# Patient Record
Sex: MALE | Race: WHITE | NOT HISPANIC OR LATINO | URBAN - METROPOLITAN AREA
[De-identification: names, ages, dates, MRNs, and addresses within clinical notes are randomized per-mention and may not be internally consistent; named-entity substitution may affect disease eponyms.]

---

## 2023-06-03 ENCOUNTER — EMERGENCY (EMERGENCY)
Facility: HOSPITAL | Age: 78
LOS: 1 days | Discharge: HOME | End: 2023-06-03
Attending: EMERGENCY MEDICINE | Admitting: EMERGENCY MEDICINE
Payer: MEDICARE

## 2023-06-03 VITALS
RESPIRATION RATE: 16 BRPM | WEIGHT: 190.04 LBS | HEART RATE: 81 BPM | SYSTOLIC BLOOD PRESSURE: 163 MMHG | DIASTOLIC BLOOD PRESSURE: 89 MMHG | OXYGEN SATURATION: 100 % | TEMPERATURE: 99 F

## 2023-06-03 PROCEDURE — 99283 EMERGENCY DEPT VISIT LOW MDM: CPT | Mod: FS

## 2023-06-03 PROCEDURE — 99282 EMERGENCY DEPT VISIT SF MDM: CPT

## 2023-06-03 NOTE — ED PROVIDER NOTE - NSFOLLOWUPINSTRUCTIONS_ED_ALL_ED_FT
Follow up with Dr. Penaloza  return to er for any worsening symptoms     Indwelling Urinary Catheter Care, Adult  An indwelling urinary catheter is a thin, flexible tube that is placed into the bladder to help drain urine out of the body. The catheter is inserted into the urethra. The urethra is the part of the body that drains urine from the bladder. Urine drains from the catheter into a drainage bag outside of the body.    Taking good care of your catheter will keep it working properly and help to prevent problems from developing.    What are the risks?  Bacteria may get into your bladder and cause a urinary tract infection.  Urine flow can become blocked. This can happen if the catheter is not working correctly, or if you have sediment or a blood clot in your bladder or catheter.  Tissue near the catheter may become irritated and may bleed.  How to wear your catheter and your drainage bag  Supplies needed    Adhesive tape or a leg strap.  Alcohol wipe or soap and water (if you use tape).  A clean towel (if you use tape).  Overnight drainage bag.  Smaller drainage bag (leg bag).  Wearing your catheter and bag    Use adhesive tape or a leg strap to attach your catheter to your leg.  Make sure the catheter is not pulled tight.  If a leg strap gets wet, replace it with a dry one.  If you use adhesive tape:  Use an alcohol wipe or soap and water to wash off any stickiness on your skin where you had tape before.  Use a clean towel to pat-dry the area.  Apply the new tape.  You should have received a large overnight drainage bag and a smaller leg bag that fits underneath clothing.  You may wear the overnight bag at any time, but you should not wear the leg bag at night.  Make sure the overnight drainage bag is always lower than the level of your bladder, but do not let it touch the floor. Before you go to sleep, hang the bag inside a wastebasket that is covered by a clean plastic bag.  Secure the leg bag according to 's instructions. This may be above or below the knee, depending on the length of the tubing. Make sure that:  The leg bag is below the bladder.  The tubing does not have loops or too much tension.  How to care for the skin around the catheter  Supplies needed    A clean washcloth.  Water and mild soap.  A clean towel.  Caring for your skin and catheter    Female anatomy showing the labia, urethra, and an indwelling urinary catheter in the bladder.  Male anatomy showing the penis, urethra, and an indwelling urinary catheter in the bladder.  Every day, use a clean washcloth and soapy water to clean the skin around your catheter.  Wash your hands with soap and water.  Wet a washcloth in warm water and mild soap.  Clean the skin around your urethra.  If you are female:  Use one hand to gently spread the folds of skin around your vagina (labia).  With the washcloth in your other hand, wipe the inner side of your labia on each side. Do this in a front-to-back direction.  If you are male:  Use one hand to pull back any skin that covers the end of your penis (foreskin).  With the washcloth in your other hand, wipe your penis in small circles. Start wiping at the tip of your penis, then move outward from the catheter.  Move the foreskin back in place, if needed.  With your free hand, hold the catheter close to where it enters your body. Keep holding the catheter during cleaning so it does not get pulled out.  Use your other hand to clean the catheter with the washcloth.  Only wipe downward on the catheter, toward the bag.  Do not wipe upward toward your body, because that may push bacteria into your urethra and cause infection.  Use a clean towel to pat-dry the catheter and the skin around it. Make sure to wipe off all soap.  Wash your hands with soap and water.  Shower every day. Do not take baths.  Do not use cream, ointment, or lotion on the area where the catheter enters your body, unless your health care provider tells you to do that.  Do not use powders, sprays, or lotions on your genital area.  Check your skin around the catheter every day for signs of infection. Check for:  Redness, swelling, or pain.  Fluid or blood.  Warmth.  Pus or a bad smell.  How to empty the drainage bag  Supplies needed    Rubbing alcohol.  Gauze pad or cotton ball.  Adhesive tape or a leg strap.  Emptying the bag    Empty your drainage bag (your overnight drainage bag or your leg bag) when it is ?–½ full, or at least 2–3 times a day. Clean the drainage bag according to the 's instructions or as told by your health care provider.  Wash your hands with soap and water.  Detach the drainage bag from your leg.  Hold the drainage bag over the toilet or a clean container. Make sure the drainage bag is lower than your hips and bladder. This stops urine from going back into the tubing and into your bladder.  Open the pour spout at the bottom of the bag.  Empty the urine into the toilet or container. Do not let the pour spout touch any surface. This precaution is important to prevent bacteria from getting in the bag and causing infection.  Apply rubbing alcohol to a gauze pad or cotton ball.  Use the gauze pad or cotton ball to clean the pour spout.  Close the pour spout.  Attach the bag to your leg with adhesive tape or a leg strap.  Wash your hands with soap and water.  How to change the drainage bag  Supplies needed:    Alcohol wipes.  A clean drainage bag.  Adhesive tape or a leg strap.  Changing the bag    Replace your drainage bag with a clean bag if it leaks, starts to smell bad, or looks dirty.  Wash your hands with soap and water.  Detach the dirty drainage bag from your leg.  Pinch the catheter with your fingers so that urine does not spill out.  Disconnect the catheter tube from the drainage tube at the connection valve. Do not let the tubes touch any surface.  Clean the end of the catheter tube with an alcohol wipe. Use a different alcohol wipe to clean the end of the drainage tube.  Connect the catheter tube to the drainage tube of the clean bag.  Attach the clean bag to your leg with adhesive tape or a leg strap. Avoid attaching the new bag too tightly.  Wash your hands with soap and water.  General instructions  A person washing hands with soap and water.  Never pull on your catheter or try to remove it. Pulling can damage your internal tissues.  Always wash your hands before and after you handle your catheter or drainage bag. Use a mild, fragrance-free soap. If soap and water are not available, use hand .  Always make sure there are no twists, bends, or kinks in the catheter tube.  Always make sure there are no leaks in the catheter or drainage bag.  Drink enough fluid to keep your urine pale yellow.  Do not take baths, swim, or use a hot tub.  If you are female, wipe from front to back after having a bowel movement.  Contact a health care provider if:  Your catheter gets clogged.  Your catheter starts to leak.  You have signs of infection at the catheter site, such as:  Redness, swelling, or pain where the catheter enters your body.  Fluid, blood, pus, or a bad smell coming from the area where the catheter enters your body.  The area where the catheter enters your body feels warm to the touch.  You have signs of a urinary tract infection, such as:  Fever or chills.  Urine smells unusually bad.  Cloudy urine.  Pain in your abdomen, legs, lower back, or bladder.  Nausea or vomiting.  Get help right away if:  You see blood in the catheter.  Your urine is pink or red.  Your bladder feels full.  Your urine is not draining into the bag.  Your catheter gets pulled out.  Summary  An indwelling urinary catheter is a thin, flexible tube that is placed into the bladder to help drain urine out of the body.  The catheter is inserted into the part of the body that drains urine from the bladder (urethra).  Take good care of your catheter to keep it working properly and help prevent problems from developing.  Always wash your hands before and after you handle your catheter or drainage bag.  Never pull on your catheter or try to remove it.  This information is not intended to replace advice given to you by your health care provider. Make sure you discuss any questions you have with your health care provider.

## 2023-06-03 NOTE — ED ADULT NURSE NOTE - NSFALLUNIVINTERV_ED_ALL_ED
Bed/Stretcher in lowest position, wheels locked, appropriate side rails in place/Call bell, personal items and telephone in reach/Instruct patient to call for assistance before getting out of bed/chair/stretcher/Non-slip footwear applied when patient is off stretcher/[place] to call system/Physically safe environment - no spills, clutter or unnecessary equipment/Purposeful proactive rounding/Room/bathroom lighting operational, light cord in reach

## 2023-06-03 NOTE — ED PROVIDER NOTE - OBJECTIVE STATEMENT
Patient is a 77-year-old male with past medical history of BPH hypertension hyperlipidemia kidney stones coming in for clogged Ugalde since this morning which was placed yesterday status post greenlight and bladder stone procedure by Dr. Penaloza.  Patient states he is on antibiotics denies any fever chills nausea vomiting.  Patient states his follow-up appointment on June 5 to remove Ugalde.

## 2023-06-03 NOTE — ED ADULT NURSE NOTE - OBJECTIVE STATEMENT
76y/o male A&ox4, ambulatory received in rm16a. pt c/o urinary retention in urinary catheter. denies abd pain, cp, sob, back pain. abd soft nontender nondistended. catheter flushed and is draining at this time as per md orders. new leg bag attached. respirations even and unlabored. abd soft nontender nondistended. md at bedside for eval. bed in lowest position, side rails up, call bell in hand, safety maintained. awaiting further orders. will continue to monitor.

## 2023-06-03 NOTE — ED PROVIDER NOTE - CLINICAL SUMMARY MEDICAL DECISION MAKING FREE TEXT BOX
77-year-old male with a history of hypertension, prostate disease, hyperlipidemia, kidney stones presents with came to ER today for a clogged Ugalde.  Patient status post greenlight procedure and bladder stone removal yesterday by Dr. Penaloza.  Patient with decreased urine output today, unable to pass urine since this morning.  Patient discussed with Dr. Penaloza  who sent the patient to the ED for Ugalde catheter irrigation.  Patient with no acute symptoms after Ugalde catheter irrigation with excellent drainage.  No other acute complaints at this time.  Exam: Nontoxic, well-appearing.  CTA BL, no W/R chest R.  Abdomen soft, nontender, nondistended.  No HSM.  No CVAT.  Ugalde catheter in place with clear yellow urine, draining well (after flushed by RN).  Ugalde catheter clogged status post procedure yesterday.  Ugalde catheter irrigated by RN, draining well, outpatient follow-up with Dr. Penaloza..  Dr. Penaloza notified of patient's condition.

## 2023-06-03 NOTE — ED ADULT NURSE NOTE - NS ED NURSE LEVEL OF CONSCIOUSNESS MENTAL STATUS
Awake/Alert
I, Scott Leblanc, performed the initial face to face bedside interview with this patient regarding history of present illness, review of symptoms and relevant past medical, social and family history.  I completed an independent physical examination.  I was the initial provider who evaluated this patient. I have signed out the follow up of any pending tests (i.e. labs, radiological studies) to the ACP.  I have communicated the patient’s plan of care and disposition with the ACP.  The history, relevant review of systems, past medical and surgical history, medical decision making, and physical examination was documented by the scribe in my presence and I attest to the accuracy of the documentation.

## 2023-06-03 NOTE — ED ADULT TRIAGE NOTE - CHIEF COMPLAINT QUOTE
c/o clogged uribe catheter, had procedure done to his bladder stone and prostate yesterday by Dr. Parkinson

## 2023-06-03 NOTE — ED PROVIDER NOTE - CARE PROVIDER_API CALL
Eloy Penaloza  Urology  [address], [address]  [place] [address]  Phone: [phone]  Fax: [phone]  Follow Up Time:

## 2023-06-03 NOTE — ED PROVIDER NOTE - PATIENT PORTAL LINK FT
You can access the FollowMyHealth Patient Portal offered by [facility] by registering at the following website: [email or web address]. By joining [employer]’s FollowMyHealth portal, you will also be able to view your health information using other applications (apps) compatible with our system.

## 2024-03-09 ENCOUNTER — EMERGENCY (EMERGENCY)
Facility: HOSPITAL | Age: 79
LOS: 1 days | Discharge: HOME | End: 2024-03-09
Attending: EMERGENCY MEDICINE
Payer: MEDICARE

## 2024-03-09 VITALS
DIASTOLIC BLOOD PRESSURE: 79 MMHG | TEMPERATURE: 98 F | RESPIRATION RATE: 18 BRPM | SYSTOLIC BLOOD PRESSURE: 153 MMHG | HEART RATE: 62 BPM | WEIGHT: 179.9 LBS | OXYGEN SATURATION: 100 % | HEIGHT: 69 IN

## 2024-03-09 DIAGNOSIS — Z98.890 OTHER SPECIFIED POSTPROCEDURAL STATES: Chronic | ICD-10-CM

## 2024-03-09 LAB
ALBUMIN SERPL ELPH-MCNC: 4.3 G/DL — SIGNIFICANT CHANGE UP (ref 3.3–5)
ALP SERPL-CCNC: 83 U/L — SIGNIFICANT CHANGE UP (ref 40–120)
ALT FLD-CCNC: 21 U/L — SIGNIFICANT CHANGE UP (ref 10–45)
ANION GAP SERPL CALC-SCNC: 11 MMOL/L — SIGNIFICANT CHANGE UP (ref 5–17)
ANION GAP SERPL CALC-SCNC: 14 MMOL/L — SIGNIFICANT CHANGE UP (ref 5–17)
APTT BLD: 27.3 SEC — SIGNIFICANT CHANGE UP (ref 24.5–35.6)
AST SERPL-CCNC: 37 U/L — SIGNIFICANT CHANGE UP (ref 10–40)
BASOPHILS # BLD AUTO: 0.01 K/UL — SIGNIFICANT CHANGE UP (ref 0–0.2)
BASOPHILS # BLD AUTO: 0.02 K/UL — SIGNIFICANT CHANGE UP (ref 0–0.2)
BASOPHILS # BLD AUTO: 0.03 K/UL — SIGNIFICANT CHANGE UP (ref 0–0.2)
BASOPHILS NFR BLD AUTO: 0.2 % — SIGNIFICANT CHANGE UP (ref 0–2)
BASOPHILS NFR BLD AUTO: 0.3 % — SIGNIFICANT CHANGE UP (ref 0–2)
BASOPHILS NFR BLD AUTO: 0.5 % — SIGNIFICANT CHANGE UP (ref 0–2)
BILIRUB SERPL-MCNC: 0.4 MG/DL — SIGNIFICANT CHANGE UP (ref 0.2–1.2)
BLD GP AB SCN SERPL QL: NEGATIVE — SIGNIFICANT CHANGE UP
BUN SERPL-MCNC: 38 MG/DL — HIGH (ref 7–23)
BUN SERPL-MCNC: 42 MG/DL — HIGH (ref 7–23)
CALCIUM SERPL-MCNC: 8.8 MG/DL — SIGNIFICANT CHANGE UP (ref 8.4–10.5)
CALCIUM SERPL-MCNC: 9.2 MG/DL — SIGNIFICANT CHANGE UP (ref 8.4–10.5)
CHLORIDE SERPL-SCNC: 105 MMOL/L — SIGNIFICANT CHANGE UP (ref 96–108)
CHLORIDE SERPL-SCNC: 106 MMOL/L — SIGNIFICANT CHANGE UP (ref 96–108)
CO2 SERPL-SCNC: 19 MMOL/L — LOW (ref 22–31)
CO2 SERPL-SCNC: 21 MMOL/L — LOW (ref 22–31)
CREAT SERPL-MCNC: 1.05 MG/DL — SIGNIFICANT CHANGE UP (ref 0.5–1.3)
CREAT SERPL-MCNC: 1.24 MG/DL — SIGNIFICANT CHANGE UP (ref 0.5–1.3)
EGFR: 60 ML/MIN/1.73M2 — SIGNIFICANT CHANGE UP
EGFR: 74 ML/MIN/1.73M2 — SIGNIFICANT CHANGE UP
EOSINOPHIL # BLD AUTO: 0.09 K/UL — SIGNIFICANT CHANGE UP (ref 0–0.5)
EOSINOPHIL # BLD AUTO: 0.13 K/UL — SIGNIFICANT CHANGE UP (ref 0–0.5)
EOSINOPHIL # BLD AUTO: 0.18 K/UL — SIGNIFICANT CHANGE UP (ref 0–0.5)
EOSINOPHIL NFR BLD AUTO: 1.7 % — SIGNIFICANT CHANGE UP (ref 0–6)
EOSINOPHIL NFR BLD AUTO: 1.9 % — SIGNIFICANT CHANGE UP (ref 0–6)
EOSINOPHIL NFR BLD AUTO: 2.8 % — SIGNIFICANT CHANGE UP (ref 0–6)
GLUCOSE SERPL-MCNC: 119 MG/DL — HIGH (ref 70–99)
GLUCOSE SERPL-MCNC: 130 MG/DL — HIGH (ref 70–99)
HCT VFR BLD CALC: 35.5 % — LOW (ref 39–50)
HCT VFR BLD CALC: 35.7 % — LOW (ref 39–50)
HCT VFR BLD CALC: 36.3 % — LOW (ref 39–50)
HCT VFR BLD CALC: 38.9 % — LOW (ref 39–50)
HGB BLD-MCNC: 11.6 G/DL — LOW (ref 13–17)
HGB BLD-MCNC: 11.7 G/DL — LOW (ref 13–17)
HGB BLD-MCNC: 12.4 G/DL — LOW (ref 13–17)
HGB BLD-MCNC: 12.8 G/DL — LOW (ref 13–17)
IMM GRANULOCYTES NFR BLD AUTO: 0.2 % — SIGNIFICANT CHANGE UP (ref 0–0.9)
IMM GRANULOCYTES NFR BLD AUTO: 0.2 % — SIGNIFICANT CHANGE UP (ref 0–0.9)
IMM GRANULOCYTES NFR BLD AUTO: 0.4 % — SIGNIFICANT CHANGE UP (ref 0–0.9)
INR BLD: 0.95 RATIO — SIGNIFICANT CHANGE UP (ref 0.85–1.18)
LYMPHOCYTES # BLD AUTO: 1.41 K/UL — SIGNIFICANT CHANGE UP (ref 1–3.3)
LYMPHOCYTES # BLD AUTO: 1.84 K/UL — SIGNIFICANT CHANGE UP (ref 1–3.3)
LYMPHOCYTES # BLD AUTO: 1.85 K/UL — SIGNIFICANT CHANGE UP (ref 1–3.3)
LYMPHOCYTES # BLD AUTO: 24.6 % — SIGNIFICANT CHANGE UP (ref 13–44)
LYMPHOCYTES # BLD AUTO: 28.9 % — SIGNIFICANT CHANGE UP (ref 13–44)
LYMPHOCYTES # BLD AUTO: 29.5 % — SIGNIFICANT CHANGE UP (ref 13–44)
MCHC RBC-ENTMCNC: 26.2 PG — LOW (ref 27–34)
MCHC RBC-ENTMCNC: 26.3 PG — LOW (ref 27–34)
MCHC RBC-ENTMCNC: 26.4 PG — LOW (ref 27–34)
MCHC RBC-ENTMCNC: 26.6 PG — LOW (ref 27–34)
MCHC RBC-ENTMCNC: 32.5 GM/DL — SIGNIFICANT CHANGE UP (ref 32–36)
MCHC RBC-ENTMCNC: 32.9 GM/DL — SIGNIFICANT CHANGE UP (ref 32–36)
MCHC RBC-ENTMCNC: 33 GM/DL — SIGNIFICANT CHANGE UP (ref 32–36)
MCHC RBC-ENTMCNC: 34.2 GM/DL — SIGNIFICANT CHANGE UP (ref 32–36)
MCV RBC AUTO: 77.7 FL — LOW (ref 80–100)
MCV RBC AUTO: 79.8 FL — LOW (ref 80–100)
MCV RBC AUTO: 80.2 FL — SIGNIFICANT CHANGE UP (ref 80–100)
MCV RBC AUTO: 80.8 FL — SIGNIFICANT CHANGE UP (ref 80–100)
MONOCYTES # BLD AUTO: 0.42 K/UL — SIGNIFICANT CHANGE UP (ref 0–0.9)
MONOCYTES # BLD AUTO: 0.7 K/UL — SIGNIFICANT CHANGE UP (ref 0–0.9)
MONOCYTES # BLD AUTO: 0.71 K/UL — SIGNIFICANT CHANGE UP (ref 0–0.9)
MONOCYTES NFR BLD AUTO: 11.1 % — SIGNIFICANT CHANGE UP (ref 2–14)
MONOCYTES NFR BLD AUTO: 8.8 % — SIGNIFICANT CHANGE UP (ref 2–14)
MONOCYTES NFR BLD AUTO: 9.4 % — SIGNIFICANT CHANGE UP (ref 2–14)
NEUTROPHILS # BLD AUTO: 2.84 K/UL — SIGNIFICANT CHANGE UP (ref 1.8–7.4)
NEUTROPHILS # BLD AUTO: 3.63 K/UL — SIGNIFICANT CHANGE UP (ref 1.8–7.4)
NEUTROPHILS # BLD AUTO: 4.76 K/UL — SIGNIFICANT CHANGE UP (ref 1.8–7.4)
NEUTROPHILS NFR BLD AUTO: 56.5 % — SIGNIFICANT CHANGE UP (ref 43–77)
NEUTROPHILS NFR BLD AUTO: 59.4 % — SIGNIFICANT CHANGE UP (ref 43–77)
NEUTROPHILS NFR BLD AUTO: 63.6 % — SIGNIFICANT CHANGE UP (ref 43–77)
NRBC # BLD: 0 /100 WBCS — SIGNIFICANT CHANGE UP (ref 0–0)
PLATELET # BLD AUTO: 170 K/UL — SIGNIFICANT CHANGE UP (ref 150–400)
PLATELET # BLD AUTO: 183 K/UL — SIGNIFICANT CHANGE UP (ref 150–400)
PLATELET # BLD AUTO: 187 K/UL — SIGNIFICANT CHANGE UP (ref 150–400)
PLATELET # BLD AUTO: 210 K/UL — SIGNIFICANT CHANGE UP (ref 150–400)
POTASSIUM SERPL-MCNC: 5.3 MMOL/L — SIGNIFICANT CHANGE UP (ref 3.5–5.3)
POTASSIUM SERPL-MCNC: 5.5 MMOL/L — HIGH (ref 3.5–5.3)
POTASSIUM SERPL-SCNC: 5.3 MMOL/L — SIGNIFICANT CHANGE UP (ref 3.5–5.3)
POTASSIUM SERPL-SCNC: 5.5 MMOL/L — HIGH (ref 3.5–5.3)
PROT SERPL-MCNC: 7.6 G/DL — SIGNIFICANT CHANGE UP (ref 6–8.3)
PROTHROM AB SERPL-ACNC: 10 SEC — SIGNIFICANT CHANGE UP (ref 9.5–13)
RBC # BLD: 4.42 M/UL — SIGNIFICANT CHANGE UP (ref 4.2–5.8)
RBC # BLD: 4.45 M/UL — SIGNIFICANT CHANGE UP (ref 4.2–5.8)
RBC # BLD: 4.67 M/UL — SIGNIFICANT CHANGE UP (ref 4.2–5.8)
RBC # BLD: 4.85 M/UL — SIGNIFICANT CHANGE UP (ref 4.2–5.8)
RBC # FLD: 13.9 % — SIGNIFICANT CHANGE UP (ref 10.3–14.5)
RBC # FLD: 14.1 % — SIGNIFICANT CHANGE UP (ref 10.3–14.5)
RH IG SCN BLD-IMP: POSITIVE — SIGNIFICANT CHANGE UP
SODIUM SERPL-SCNC: 138 MMOL/L — SIGNIFICANT CHANGE UP (ref 135–145)
SODIUM SERPL-SCNC: 138 MMOL/L — SIGNIFICANT CHANGE UP (ref 135–145)
WBC # BLD: 4.78 K/UL — SIGNIFICANT CHANGE UP (ref 3.8–10.5)
WBC # BLD: 5.74 K/UL — SIGNIFICANT CHANGE UP (ref 3.8–10.5)
WBC # BLD: 6.41 K/UL — SIGNIFICANT CHANGE UP (ref 3.8–10.5)
WBC # BLD: 7.48 K/UL — SIGNIFICANT CHANGE UP (ref 3.8–10.5)
WBC # FLD AUTO: 4.78 K/UL — SIGNIFICANT CHANGE UP (ref 3.8–10.5)
WBC # FLD AUTO: 5.74 K/UL — SIGNIFICANT CHANGE UP (ref 3.8–10.5)
WBC # FLD AUTO: 6.41 K/UL — SIGNIFICANT CHANGE UP (ref 3.8–10.5)
WBC # FLD AUTO: 7.48 K/UL — SIGNIFICANT CHANGE UP (ref 3.8–10.5)

## 2024-03-09 PROCEDURE — 99223 1ST HOSP IP/OBS HIGH 75: CPT

## 2024-03-09 RX ORDER — ATORVASTATIN CALCIUM 80 MG/1
80 TABLET, FILM COATED ORAL AT BEDTIME
Refills: 0 | Status: DISCONTINUED | OUTPATIENT
Start: 2024-03-09 | End: 2024-03-12

## 2024-03-09 RX ORDER — METFORMIN HYDROCHLORIDE 850 MG/1
1000 TABLET ORAL
Refills: 0 | Status: DISCONTINUED | OUTPATIENT
Start: 2024-03-09 | End: 2024-03-12

## 2024-03-09 RX ADMIN — METFORMIN HYDROCHLORIDE 1000 MILLIGRAM(S): 850 TABLET ORAL at 20:19

## 2024-03-09 NOTE — ED PROVIDER NOTE - OBJECTIVE STATEMENT
77-year-old male with past medical history of benign prostatic hyperplasia, hypertension, dyslipidemia, coronary artery disease with no previous stents on antiplatelet therapy, presents the emergency department due to BRBPR after colonoscopy yesterday.  He last took his antiplatelet on Monday.  He had 15 polyps removed.  He denies chest pain, shortness of breath, palpitations, headaches, dizziness.

## 2024-03-09 NOTE — ED CDU PROVIDER DISPOSITION NOTE - NSFOLLOWUPINSTRUCTIONS_ED_ALL_ED_FT
stay hydrated    Follow up with Charan Vela  Gastroenterology  [address], [address]  [place] [address]  Phone: [phone]  Fax: [phone]    Bring a copy of your test results with you to your appointment    Continue your current medication regimen    Return to the Emergency Room if you experience new or worsening symptoms    SEEK IMMEDIATE MEDICAL CARE IF YOU HAVE ANY OF THE FOLLOWING SYMPTOMS: extreme weakness/chest pain/shortness of breath, large clots , vomiting blood, fainting, fever, or any signs of dehydration. 1) Follow-up with your primary care provider in 1-2 days.    **Please discuss resuming aspirin and Plavix with your PCP**    Follow up with Gastroenterology, Charan Vela  Gastroenterology  [address] [place] [address], [address]  [place] [address]  Phone: [phone]  Fax: [phone]    2) Continue to take all medications as prescribed.    3) Rest and stay hydrated. Pain can be managed with Acetaminophen (aka Tylenol) and Ibuprofen (aka Motrin or Advil) over the counter as directed.    4) Return to the ER for any new or worsening symptoms.      Please read all attached patient information.     SEEK IMMEDIATE MEDICAL CARE IF YOU HAVE ANY OF THE FOLLOWING SYMPTOMS: extreme weakness/chest pain/shortness of breath, large clots , vomiting blood, fainting, fever, or any signs of dehydration.

## 2024-03-09 NOTE — ED PROVIDER NOTE - ATTENDING CONTRIBUTION TO CARE
78-year-old male on Plavix Monday Wednesday Friday last dose was 5 days ago had a colonoscopy yesterday with polypectomy presenting this morning with 3 bloody bowel movements onset at 4 AM this morning.  Denies any abdominal pain.  Denies any chest pain palpitation shortness of breath dizziness syncope nausea or vomiting.  Vital signs are within normal limits.  Patient is well-appearing in no acute distress.  There is no pallor present.  There is no conjunctival pallor.  Normal heart and lung sounds.  Abdomen soft nontender nondistended.  Extremities are warm and well-perfused.  Will check screening labs and monitor on telemetry.  Will repeat labs in approximately 4 hours while we monitor for any further episodes of bleeding.  Disposition will be based upon results of labs and period of monitoring in the emergency department.

## 2024-03-09 NOTE — ED CDU PROVIDER DISPOSITION NOTE - CARE PROVIDER_API CALL
Charan Flores  Gastroenterology  [address], [address]  [place] [address]  Phone: [phone]  Fax: [phone]  Follow Up Time:

## 2024-03-09 NOTE — ED CDU PROVIDER INITIAL DAY NOTE - PROGRESS NOTE DETAILS
d/w Dr. Flores re: serial cbcs and results thus far. patient with no further bleeding episodes. plan for cbc at 10 pm, if remains stable, patient can safely be discharged, Dr. Flores in agreement. will discuss discharge with Dr Becker and sign out to overnight acp I received signout on the patient at the usual time.  Patient evaluated at bedside, hemodynamically stable, no acute complaints.  Patient abdomen is soft nontender.  As per my signout, previous team discussed with gastroenterology attending Dr. Flores reports that patient may be discharged from a GI perspective if hemoglobin remained stable on repeat this evening.  CBC sent. will continue to monitor.

## 2024-03-09 NOTE — ED CDU PROVIDER DISPOSITION NOTE - PATIENT PORTAL LINK FT
You can access the FollowMyHealth Patient Portal offered by [facility] by registering at the following website: [email or web address]. By joining [employer]’s FollowMyHealth portal, you will also be able to view your health information using other applications (apps) compatible with our system. You can access the FollowMyHealth Patient Portal offered by [facility] by registering at the following website: [email or web address]. By joining [employer]’s FollowMyHealth portal, you will also be able to view your health information using other applications (apps) compatible with our system.

## 2024-03-09 NOTE — ED PROVIDER NOTE - CLINICAL SUMMARY MEDICAL DECISION MAKING FREE TEXT BOX
77M with bright red blood per rectum after a colonoscopy. Will obtain two hemoglobin  by multiple hours and type and screen. Patient to be observed for repeat bleeding.

## 2024-03-09 NOTE — ED CDU PROVIDER DISPOSITION NOTE - CLINICAL COURSE
79 y/o male HTN, HLD, DM, nonobstructive CAD, on antiplatelet presenting to the ED for evaluation of BRBPR following colonoscopy. patient discharged home yesterday 10 am following colonoscopy with 15 polyps removed, was well until 3 am when he had 3 episodes of large volume brbpr. patient on antiplatelet but has not taken in several days. patient seen by gastro in ED, had 4 hour hgb with a 1 pt drop in hgb. patient placed in CDU for serial cbc, abdominal exams. 79 y/o male HTN, HLD, DM, nonobstructive CAD, on antiplatelet presenting to the ED for evaluation of BRBPR following colonoscopy. patient discharged home yesterday 10 am following colonoscopy with 15 polyps removed, was well until 3 am when he had 3 episodes of large volume brbpr. patient on antiplatelet but has not taken in several days. patient seen by gastro in ED, had 4 hour hgb with a 1 pt drop in hgb. patient placed in CDU for serial cbc, abdominal exams.  In CDU patient had no further symptoms of BRBPR.  Hemoglobin remained stable.  Case was discussed with GI attending Dr. Flores provide no contraindications to discharge. 79 y/o male HTN, HLD, DM, nonobstructive CAD, on antiplatelet presenting to the ED for evaluation of BRBPR following colonoscopy. patient discharged home yesterday 10 am following colonoscopy with 15 polyps removed, was well until 3 am when he had 3 episodes of large volume brbpr. patient on antiplatelet but has not taken in several days. patient seen by gastro in ED, had 4 hour hgb with a 1 pt drop in hgb. patient placed in CDU for serial cbc, abdominal exams.  In CDU patient had one episode of dry blood.  Hemoglobin remained stable.  Case was discussed with GI attending Dr. Flores provide no contraindications to discharge.

## 2024-03-09 NOTE — ED ADULT NURSE REASSESSMENT NOTE - NS ED NURSE REASSESS COMMENT FT1
Pt received from NARAYAN Woodard. Pt oriented to CDU & plan of care was discussed. Pt A&O x 4. Pt in CDU for serial CBC, serial abdominal exams, GI following. Pt denies any abdominal pain, nausea, vomiting. V/S stable, pt afebrile,  IV in place, patent and free of signs of infiltration. Pt resting in bed. Safety & comfort measures maintained. Call bell in reach. Will continue to monitor. Pt received from NARAYAN Woodard. Pt oriented to CDU & plan of care was discussed. Pt A&O x 4. Pt in CDU for serial CBC, serial abdominal exams, GI following. Pt denies any abdominal pain, nausea, vomiting. denies any bloody diarrhea since coming to ER. V/S stable, pt afebrile,  IV in place, patent and free of signs of infiltration. Pt resting in bed. Safety & comfort measures maintained. Call bell in reach. Will continue to monitor.

## 2024-03-09 NOTE — ED ADULT NURSE NOTE - NS ED NURSE LEVEL OF CONSCIOUSNESS AFFECT
Established Patient - Audio Only Telehealth Visit     The patient location is: home  The chief complaint leading to consultation is: dm  Visit type: Virtual visit with audio only (telephone)  Total time spent with patient: 30       The reason for the audio only service rather than synchronous audio and video virtual visit was related to technical difficulties or patient preference/necessity.     Each patient to whom I provide medical services by telemedicine is:  (1) informed of the relationship between the physician and patient and the respective role of any other health care provider with respect to management of the patient; and (2) notified that they may decline to receive medical services by telemedicine and may withdraw from such care at any time. Patient verbally consented to receive this service via voice-only telephone call.       HPI: pt with dm now with fbs in the low to mid 100's on metformin 1000 mg bid with new amaryl 1 mg qd started end of march  Pt has htn no sob/cp no cough on ace bp fine at home      Assessment and plan:  Dm cont meds ada diet  htn cont meds low salt diet  Orders hgb a1c in 2 weeks                         This service was not originating from a related E/M service provided within the previous 7 days nor will  to an E/M service or procedure within the next 24 hours or my soonest available appointment.  Prevailing standard of care was able to be met in this audio-only visit.        
Calm

## 2024-03-09 NOTE — ED ADULT NURSE NOTE - NSFALLHARMRISKINTERV_ED_ALL_ED

## 2024-03-09 NOTE — ED CDU PROVIDER INITIAL DAY NOTE - OBJECTIVE STATEMENT
77-year-old male with past medical history of benign prostatic hyperplasia, hypertension, dyslipidemia, coronary artery disease with no previous stents on antiplatelet therapy (asa daily and plavix 3x per week), presents the emergency department due to BRBPR after colonoscopy yesterday.  He last took his antiplatelet on Monday.  He had 15 polyps removed.  He denies chest pain, shortness of breath, palpitations, headaches, dizziness. reports no additional episodes of BRBPR since arrival to the ED. understands plan for observation and serial hgb

## 2024-03-09 NOTE — ED CDU PROVIDER INITIAL DAY NOTE - CLINICAL SUMMARY MEDICAL DECISION MAKING FREE TEXT BOX
78 yo M s/p colonoscopy yesterday w/ mult polypectomies,  on DAPT (off since monday) for CAD, presenting for BRBPR. No CP or SOB. HDS on arrival. Abd exam benign. Pt overall well appearing, NAD. Initial work up w/ mild anemia. Seen by GI - plan for repeat CBCs in CDU and monitoring for bleeding recurrence. Pt agreeable to plan.

## 2024-03-09 NOTE — ED PROVIDER NOTE - NSFOLLOWUPINSTRUCTIONS_ED_ALL_ED_FT
You were seen in the emergency department for bleeding from your rectum. We have evaluated you and determined that you do not require further hospital interventions.    During your stay you had the following relevant results: Multiple hemoglobin levels that show a normal red blood cell count    Please follow up with your GASTROENTEROLOGIST to discuss the results of your stay in our department.    You may continue to experience pain after being discharged.  For your pain, you can take 2 tablets (1000 mg) of acetaminophen (brand name Tylenol®) every 6 hours.  If you still have pain, you can also take 2 tablets (400 mg) of ibuprofen (brand names Motrin® or Advil®) every 6 hours.  For better pain control, it is recommended that you alternate these medications every 3 hours.  You should not take more than 4 doses of each medication in a 24-hour period.    If you start to experience worsening symptoms such as nausea or vomiting, fevers or chills, chest pain or shortness of breath, please return to the emergency department for further evaluation.

## 2024-03-09 NOTE — ED ADULT NURSE NOTE - OBJECTIVE STATEMENT
PT is a 79yo M coming from home c/o rectal bleeding. Pt states that he went for a colposcopy yesterday and had 15 benign polyps removed, woke up from sleep around 4am with abd pain and had 3 bloody liquid BM, has not had any bloody BM since, on Plavix last took Monday (normally takes MWF). PT spouse at bedside endorses pt color is paler than usual. PMH of BPH, HTN, HLD. PT A,Ox4, ambulatory at baseline. Respirations even and unlabored, abd soft, nondistended and nontender, skin warm, dry and intact, MIGUEL. Denies HA, CP, SOB, n/v, fever, chills and urinary symptoms. Stretcher locked in lowest position, appropriate side rails up for safety, pt instructed to call for RN if anything needed.

## 2024-03-09 NOTE — ED CDU PROVIDER DISPOSITION NOTE - CONDITION AT DISCHARGE:
Patient resting in bed call light in reach states no needs at this time no acute distress noted respirations easy and unlabored at this time        Tamia Ordoñez RN  10/17/17 1958 Satisfactory

## 2024-03-09 NOTE — ED CDU PROVIDER DISPOSITION NOTE - ATTENDING APP SHARED VISIT CONTRIBUTION OF CARE
Attending MD Seymour: I personally made/approved the management plan and take responsibility for the patient management.

## 2024-03-09 NOTE — ED PROVIDER NOTE - PHYSICAL EXAMINATION
General: well appearing, alert, oriented to person, time, place  Psych: mood appropriate  Head: normocephalic; atraumatic  Eyes: conjunctivae clear bilaterally, sclerae anicteric  ENT: no nasal flaring, patent nares  Cardio: non-tachycardic; skin warm and well perfused  Resp: normal respiratory effort; no accessory muscle use  GI: abdomen soft, nontender, nondistended  Neuro: normal sensation, moving all four extremities equally  Skin: no pale mucosa  MSK: normal movement of all extremities  Lymph/Vasc: no LE edema

## 2024-03-09 NOTE — ED CDU PROVIDER INITIAL DAY NOTE - ATTENDING APP SHARED VISIT CONTRIBUTION OF CARE
I, Gwyn Pastor, have performed a history and physical exam on this patient, and discussed their management with the LILI. I have fully participated in the care of this patient. I agree with the history, physical exam, and plan as documented by the LILI

## 2024-03-09 NOTE — ED CDU PROVIDER INITIAL DAY NOTE - NSICDXPASTMEDICALHX_GEN_ALL_CORE_FT
PAST MEDICAL HISTORY:  BPH (benign prostatic hyperplasia)     DM (diabetes mellitus)     Hyperlipidemia     Hypertension

## 2024-03-10 VITALS
TEMPERATURE: 98 F | HEART RATE: 85 BPM | DIASTOLIC BLOOD PRESSURE: 80 MMHG | OXYGEN SATURATION: 100 % | RESPIRATION RATE: 19 BRPM | SYSTOLIC BLOOD PRESSURE: 139 MMHG

## 2024-03-10 LAB
BASOPHILS # BLD AUTO: 0.03 K/UL — SIGNIFICANT CHANGE UP (ref 0–0.2)
BASOPHILS NFR BLD AUTO: 0.5 % — SIGNIFICANT CHANGE UP (ref 0–2)
EOSINOPHIL # BLD AUTO: 0.19 K/UL — SIGNIFICANT CHANGE UP (ref 0–0.5)
EOSINOPHIL NFR BLD AUTO: 3.2 % — SIGNIFICANT CHANGE UP (ref 0–6)
HCT VFR BLD CALC: 34.9 % — LOW (ref 39–50)
HGB BLD-MCNC: 12 G/DL — LOW (ref 13–17)
IMM GRANULOCYTES NFR BLD AUTO: 0.5 % — SIGNIFICANT CHANGE UP (ref 0–0.9)
LYMPHOCYTES # BLD AUTO: 1.31 K/UL — SIGNIFICANT CHANGE UP (ref 1–3.3)
LYMPHOCYTES # BLD AUTO: 21.9 % — SIGNIFICANT CHANGE UP (ref 13–44)
MCHC RBC-ENTMCNC: 26.6 PG — LOW (ref 27–34)
MCHC RBC-ENTMCNC: 34.4 GM/DL — SIGNIFICANT CHANGE UP (ref 32–36)
MCV RBC AUTO: 77.4 FL — LOW (ref 80–100)
MONOCYTES # BLD AUTO: 0.51 K/UL — SIGNIFICANT CHANGE UP (ref 0–0.9)
MONOCYTES NFR BLD AUTO: 8.5 % — SIGNIFICANT CHANGE UP (ref 2–14)
NEUTROPHILS # BLD AUTO: 3.91 K/UL — SIGNIFICANT CHANGE UP (ref 1.8–7.4)
NEUTROPHILS NFR BLD AUTO: 65.4 % — SIGNIFICANT CHANGE UP (ref 43–77)
NRBC # BLD: 0 /100 WBCS — SIGNIFICANT CHANGE UP (ref 0–0)
PLATELET # BLD AUTO: 174 K/UL — SIGNIFICANT CHANGE UP (ref 150–400)
RBC # BLD: 4.51 M/UL — SIGNIFICANT CHANGE UP (ref 4.2–5.8)
RBC # FLD: 13.8 % — SIGNIFICANT CHANGE UP (ref 10.3–14.5)
WBC # BLD: 5.98 K/UL — SIGNIFICANT CHANGE UP (ref 3.8–10.5)
WBC # FLD AUTO: 5.98 K/UL — SIGNIFICANT CHANGE UP (ref 3.8–10.5)

## 2024-03-10 PROCEDURE — 99239 HOSP IP/OBS DSCHRG MGMT >30: CPT

## 2024-03-10 PROCEDURE — 85025 COMPLETE CBC W/AUTO DIFF WBC: CPT

## 2024-03-10 PROCEDURE — 85027 COMPLETE CBC AUTOMATED: CPT

## 2024-03-10 PROCEDURE — 86850 RBC ANTIBODY SCREEN: CPT

## 2024-03-10 PROCEDURE — 36000 PLACE NEEDLE IN VEIN: CPT

## 2024-03-10 PROCEDURE — 85730 THROMBOPLASTIN TIME PARTIAL: CPT

## 2024-03-10 PROCEDURE — 86901 BLOOD TYPING SEROLOGIC RH(D): CPT

## 2024-03-10 PROCEDURE — 99284 EMERGENCY DEPT VISIT MOD MDM: CPT

## 2024-03-10 PROCEDURE — 80053 COMPREHEN METABOLIC PANEL: CPT

## 2024-03-10 PROCEDURE — 85610 PROTHROMBIN TIME: CPT

## 2024-03-10 PROCEDURE — 86900 BLOOD TYPING SEROLOGIC ABO: CPT

## 2024-03-10 PROCEDURE — G0378: CPT

## 2024-03-10 PROCEDURE — 80048 BASIC METABOLIC PNL TOTAL CA: CPT

## 2024-03-10 NOTE — ED ADULT NURSE REASSESSMENT NOTE - NS ED NURSE REASSESS COMMENT FT1
Pt discharged as per provider. Teaching provided by TAYLOR Gillette. Pt verbalizes understanding to discharge orders & will follow up with PMD post discharge. IV lock removed. No bleeding noted. Pt stable upon discharge.

## 2024-03-10 NOTE — ED CDU PROVIDER SUBSEQUENT DAY NOTE - CLINICAL SUMMARY MEDICAL DECISION MAKING FREE TEXT BOX
Attending MD Seymour: 78-year-old gentleman with a history of CAD on aspirin and Plavix (no report of any stents however, recent screening colonoscopy several days prior where he reports multiple polypectomies was observed in the CDU overnight for hematochezia.  The patient states overnight he has no abdominal pain is doing well.  He did note this morning though he had a bowel movement with "dried blood" in it.  Patient's hemoglobin trend from yesterday went from 12.8-12 this morning 6 AM.    Patient's vital signs this morning are within normal limits he is lying in the stretcher in no apparent distress.  The abdomen is soft nontender nondistended clear lungs anteriorly regular heart sounds.  Extremities warm to the touch.    Patient with hematochezia in the setting of recent screening colonoscopy with polypectomy.  Patient previously on dual antiplatelet therapy most recently 1 week prior.  Clinically at this time no evidence of brisk lower GI bleeding however given recurrent small amount of hematochezia this morning, we reached out to consulting GI physician Dr. Flores who states he is not overly concerned about this but he will assess patient.  Patient's ultimate disposition and treatment plan will be in accordance with recommendations from consulting GI physician.

## 2024-03-10 NOTE — PROGRESS NOTE ADULT - SUBJECTIVE AND OBJECTIVE BOX
Patient is a 78y Male     Patient is a 78y old  Male who presents with a chief complaint of     HPI:      PAST MEDICAL & SURGICAL HISTORY:  Hypertension      Hyperlipidemia      BPH (benign prostatic hyperplasia)          MEDICATIONS  (STANDING):      Allergies    Cipro (Other)    Intolerances        SOCIAL HISTORY:  Denies ETOh,Smoking,     FAMILY HISTORY:      REVIEW OF SYSTEMS:    CONSTITUTIONAL: No weakness, fevers or chills  EYES/ENT: No visual changes;  No vertigo or throat pain   NECK: No pain or stiffness  RESPIRATORY: No cough, wheezing, hemoptysis; No shortness of breath  CARDIOVASCULAR: No chest pain or palpitations  GASTROINTESTINAL: No abdominal or epigastric pain. No nausea, vomiting, or hematemesis; No diarrhea or constipation. No melena or hematochezia.  GENITOURINARY: No dysuria, frequency or hematuria  NEUROLOGICAL: No numbness or weakness  SKIN: No itching, burning, rashes, or lesions   All other review of systems is negative unless indicated above.    VITAL:  T(C): , Max: 36.6 (03-09-24 @ 05:59)  T(F): , Max: 97.9 (03-09-24 @ 05:59)  HR: 67 (03-09-24 @ 08:42)  BP: 131/70 (03-09-24 @ 08:42)  BP(mean): --  RR: 16 (03-09-24 @ 08:42)  SpO2: 98% (03-09-24 @ 08:42)  Wt(kg): --    I and O's:    Height (cm): 175.3 (03-09 @ 05:59)  Weight (kg): 81.6 (03-09 @ 05:59)  BMI (kg/m2): 26.6 (03-09 @ 05:59)  BSA (m2): 1.98 (03-09 @ 05:59)    PHYSICAL EXAM:    Constitutional: NAD  HEENT: PERRLA,   Neck: No JVD  Respiratory: CTA B/L  Cardiovascular: S1 and S2  Gastrointestinal: BS+, soft, NT/ND  Extremities: No peripheral edema  Neurological: A/O x 3, no focal deficits  Psychiatric: Normal mood, normal affect  : No Ugalde  Skin: No rashes  Access: Not applicable  Back: No CVA tenderness    LABS:                        11.6   5.74  )-----------( 183      ( 09 Mar 2024 10:02 )             35.7     03-09    138  |  105  |  42<H>  ----------------------------<  130<H>  5.5<H>   |  19<L>  |  1.24    Ca    9.2      09 Mar 2024 06:28    TPro  7.6  /  Alb  4.3  /  TBili  0.4  /  DBili  x   /  AST  37  /  ALT  21  /  AlkPhos  83  03-09          RADIOLOGY & ADDITIONAL STUDIES:                          
Patient is a 78y Male     Patient is a 78y old  Male who presents with a chief complaint of blood in stool (09 Mar 2024 10:19)      HPI:      PAST MEDICAL & SURGICAL HISTORY:  Hypertension      Hyperlipidemia      BPH (benign prostatic hyperplasia)      DM (diabetes mellitus)      H/O transurethral resection of prostate          MEDICATIONS  (STANDING):  atorvastatin 80 milliGRAM(s) Oral at bedtime  metFORMIN 1000 milliGRAM(s) Oral two times a day      Allergies    Cipro (Other)    Intolerances        SOCIAL HISTORY:  Denies ETOh,Smoking,     FAMILY HISTORY:  Family history of diabetes mellitus (DM) (Mother)        REVIEW OF SYSTEMS:    CONSTITUTIONAL: No weakness, fevers or chills  EYES/ENT: No visual changes;  No vertigo or throat pain   NECK: No pain or stiffness  RESPIRATORY: No cough, wheezing, hemoptysis; No shortness of breath  CARDIOVASCULAR: No chest pain or palpitations  GASTROINTESTINAL: No abdominal or epigastric pain. No nausea, vomiting, or hematemesis; No diarrhea or constipation. No melena or hematochezia.  GENITOURINARY: No dysuria, frequency or hematuria  NEUROLOGICAL: No numbness or weakness  SKIN: No itching, burning, rashes, or lesions   All other review of systems is negative unless indicated above.    VITAL:  T(C): , Max: 37 (03-09-24 @ 20:00)  T(F): , Max: 98.6 (03-09-24 @ 20:00)  HR: 85 (03-10-24 @ 08:25)  BP: 139/80 (03-10-24 @ 08:25)  BP(mean): 98 (03-10-24 @ 08:25)  RR: 19 (03-10-24 @ 08:25)  SpO2: 100% (03-10-24 @ 08:25)  Wt(kg): --    I and O's:        PHYSICAL EXAM:    Constitutional: NAD  HEENT: PERRLA,   Neck: No JVD  Respiratory: CTA B/L  Cardiovascular: S1 and S2  Gastrointestinal: BS+, soft, NT/ND  Extremities: No peripheral edema  Neurological: A/O x 3, no focal deficits  Psychiatric: Normal mood, normal affect  : No Ugalde  Skin: No rashes  Access: Not applicable  Back: No CVA tenderness    LABS:                        12.0   5.98  )-----------( 174      ( 10 Mar 2024 06:01 )             34.9     03-09    138  |  106  |  38<H>  ----------------------------<  119<H>  5.3   |  21<L>  |  1.05    Ca    8.8      09 Mar 2024 10:02    TPro  7.6  /  Alb  4.3  /  TBili  0.4  /  DBili  x   /  AST  37  /  ALT  21  /  AlkPhos  83  03-09          RADIOLOGY & ADDITIONAL STUDIES:

## 2024-03-10 NOTE — ED CDU PROVIDER SUBSEQUENT DAY NOTE - HISTORY
Repeat CBC shows increased hemoglobin to 12.4.  Results discussed with patient.  Patient feeling well at this time and vital signs stable from last read.  However patient reports he is tired given the late hour and would like to stay in the CDU overnight rather than be discharged at this time. -Naif Reyez PA-C

## 2024-03-10 NOTE — PROGRESS NOTE ADULT - ASSESSMENT
pt with some dark blood this am,  likely old  hgb is stable  n o further bleeding  no blood today  discharge plan  op follow up  if weak or dizzy or further bleeding to return to ed.
called this am, pt with rectal bleeding  pt had stopped plavix over five days before colon and had   cold snare / bx polyps  pt with no bleeding in er  hgb stable  spoke to er doc.  obs will be tried if possilb  clear liquids for nwo appropriate

## 2024-03-10 NOTE — ED CDU PROVIDER SUBSEQUENT DAY NOTE - PROGRESS NOTE DETAILS
repeat hgb 12. patient states had a bowel movement this morning with dry blood. spoke with Dr. Flores. states will be in to see patient this morning. will continue to monitor. discussed with Dr. Seymour. seen by Dr. Flores this morning. states okay for patient to be discharged and follow up outpatient. patient in agreement with plan. well appearing. advised to continue his medication as instructed by his doctors. patient in agreement to follow up with pcp and GI. stable for discharge. discussed with Dr. Seymour.

## 2024-03-10 NOTE — ED ADULT NURSE REASSESSMENT NOTE - NS ED NURSE REASSESS COMMENT FT1
Pt received from NARAYAN Ramirez at 19:00hrs. Pt A&O x 4. Pt in CDU for Telemetry, CBC monitoring. CBC repeated, 12.4. Patient had no BM as of this time. Pt denies any chest pain, SOB, dizziness or palpitations. No events on tele as of this time. V/S stable, IV 20G, BL hand, patent and free of signs of infiltration. Pt ambulated to BR independently. Safety and comfort measures maintained. Educated pt to call for assistance as needed. Call bell in reach. Will continue to monitor.

## 2024-11-06 PROBLEM — E11.9 TYPE 2 DIABETES MELLITUS WITHOUT COMPLICATIONS: Chronic | Status: ACTIVE | Noted: 2024-03-09

## 2024-12-10 ENCOUNTER — NON-APPOINTMENT (OUTPATIENT)
Age: 79
End: 2024-12-10

## 2024-12-10 ENCOUNTER — APPOINTMENT (OUTPATIENT)
Dept: UROLOGY | Facility: CLINIC | Age: 79
End: 2024-12-10
Payer: MEDICARE

## 2024-12-10 VITALS
WEIGHT: 180 LBS | BODY MASS INDEX: 26.66 KG/M2 | RESPIRATION RATE: 17 BRPM | TEMPERATURE: 97.9 F | HEIGHT: 69 IN | HEART RATE: 64 BPM | SYSTOLIC BLOOD PRESSURE: 136 MMHG | DIASTOLIC BLOOD PRESSURE: 64 MMHG

## 2024-12-10 DIAGNOSIS — R97.20 ELEVATED PROSTATE, SPECIFIC ANTIGEN [PSA]: ICD-10-CM

## 2024-12-10 PROCEDURE — 99203 OFFICE O/P NEW LOW 30 MIN: CPT

## 2024-12-10 PROCEDURE — G2211 COMPLEX E/M VISIT ADD ON: CPT

## 2025-06-10 ENCOUNTER — NON-APPOINTMENT (OUTPATIENT)
Age: 80
End: 2025-06-10

## 2025-06-10 ENCOUNTER — APPOINTMENT (OUTPATIENT)
Dept: UROLOGY | Facility: CLINIC | Age: 80
End: 2025-06-10
Payer: MEDICARE

## 2025-06-10 VITALS
DIASTOLIC BLOOD PRESSURE: 67 MMHG | HEIGHT: 69 IN | RESPIRATION RATE: 17 BRPM | HEART RATE: 70 BPM | SYSTOLIC BLOOD PRESSURE: 126 MMHG | BODY MASS INDEX: 27.25 KG/M2 | TEMPERATURE: 98.3 F | WEIGHT: 184 LBS

## 2025-06-10 PROBLEM — C61 CANCER OF PROSTATE: Status: ACTIVE | Noted: 2025-06-10

## 2025-06-10 PROCEDURE — 99214 OFFICE O/P EST MOD 30 MIN: CPT

## 2025-06-12 LAB — BACTERIA UR CULT: NORMAL

## 2025-06-16 ENCOUNTER — OUTPATIENT (OUTPATIENT)
Dept: OUTPATIENT SERVICES | Facility: HOSPITAL | Age: 80
LOS: 1 days | End: 2025-06-16
Payer: MEDICARE

## 2025-06-16 ENCOUNTER — APPOINTMENT (OUTPATIENT)
Dept: CT IMAGING | Facility: CLINIC | Age: 80
End: 2025-06-16
Payer: MEDICARE

## 2025-06-16 DIAGNOSIS — Z98.890 OTHER SPECIFIED POSTPROCEDURAL STATES: Chronic | ICD-10-CM

## 2025-06-16 DIAGNOSIS — R31.0 GROSS HEMATURIA: ICD-10-CM

## 2025-06-16 LAB — URINE CYTOLOGY: NORMAL

## 2025-06-16 PROCEDURE — 74178 CT ABD&PLV WO CNTR FLWD CNTR: CPT

## 2025-06-16 PROCEDURE — 74178 CT ABD&PLV WO CNTR FLWD CNTR: CPT | Mod: 26

## 2025-07-01 ENCOUNTER — OUTPATIENT (OUTPATIENT)
Dept: OUTPATIENT SERVICES | Facility: HOSPITAL | Age: 80
LOS: 1 days | End: 2025-07-01
Payer: MEDICARE

## 2025-07-01 ENCOUNTER — APPOINTMENT (OUTPATIENT)
Dept: NUCLEAR MEDICINE | Facility: IMAGING CENTER | Age: 80
End: 2025-07-01
Payer: MEDICARE

## 2025-07-01 DIAGNOSIS — Z00.8 ENCOUNTER FOR OTHER GENERAL EXAMINATION: ICD-10-CM

## 2025-07-01 DIAGNOSIS — Z98.890 OTHER SPECIFIED POSTPROCEDURAL STATES: Chronic | ICD-10-CM

## 2025-07-01 DIAGNOSIS — C61 MALIGNANT NEOPLASM OF PROSTATE: ICD-10-CM

## 2025-07-01 PROCEDURE — 78816 PET IMAGE W/CT FULL BODY: CPT | Mod: 26

## 2025-07-01 PROCEDURE — A9608: CPT

## 2025-07-01 PROCEDURE — 78816 PET IMAGE W/CT FULL BODY: CPT

## 2025-07-24 ENCOUNTER — APPOINTMENT (OUTPATIENT)
Dept: UROLOGY | Facility: CLINIC | Age: 80
End: 2025-07-24

## 2025-08-12 ENCOUNTER — APPOINTMENT (OUTPATIENT)
Dept: UROLOGY | Facility: CLINIC | Age: 80
End: 2025-08-12
Payer: MEDICARE

## 2025-08-12 ENCOUNTER — OUTPATIENT (OUTPATIENT)
Dept: OUTPATIENT SERVICES | Facility: HOSPITAL | Age: 80
LOS: 1 days | End: 2025-08-12
Payer: MEDICARE

## 2025-08-12 VITALS
HEART RATE: 58 BPM | OXYGEN SATURATION: 98 % | SYSTOLIC BLOOD PRESSURE: 158 MMHG | RESPIRATION RATE: 16 BRPM | DIASTOLIC BLOOD PRESSURE: 79 MMHG

## 2025-08-12 DIAGNOSIS — R97.20 ELEVATED PROSTATE, SPECIFIC ANTIGEN [PSA]: ICD-10-CM

## 2025-08-12 DIAGNOSIS — R35.0 FREQUENCY OF MICTURITION: ICD-10-CM

## 2025-08-12 DIAGNOSIS — R91.1 SOLITARY PULMONARY NODULE: ICD-10-CM

## 2025-08-12 DIAGNOSIS — R31.0 GROSS HEMATURIA: ICD-10-CM

## 2025-08-12 DIAGNOSIS — Z98.890 OTHER SPECIFIED POSTPROCEDURAL STATES: Chronic | ICD-10-CM

## 2025-08-12 PROCEDURE — 52000 CYSTOURETHROSCOPY: CPT

## 2025-08-13 DIAGNOSIS — R31.0 GROSS HEMATURIA: ICD-10-CM

## 2025-08-13 DIAGNOSIS — R97.20 ELEVATED PROSTATE SPECIFIC ANTIGEN [PSA]: ICD-10-CM

## 2025-08-13 DIAGNOSIS — R91.1 SOLITARY PULMONARY NODULE: ICD-10-CM

## 2025-08-21 ENCOUNTER — APPOINTMENT (OUTPATIENT)
Dept: SURGERY | Facility: CLINIC | Age: 80
End: 2025-08-21
Payer: MEDICARE

## 2025-08-21 VITALS
BODY MASS INDEX: 26.96 KG/M2 | DIASTOLIC BLOOD PRESSURE: 68 MMHG | WEIGHT: 182 LBS | HEIGHT: 69 IN | SYSTOLIC BLOOD PRESSURE: 116 MMHG | HEART RATE: 57 BPM

## 2025-08-21 DIAGNOSIS — Z86.39 PERSONAL HISTORY OF OTHER ENDOCRINE, NUTRITIONAL AND METABOLIC DISEASE: ICD-10-CM

## 2025-08-21 DIAGNOSIS — E04.1 NONTOXIC SINGLE THYROID NODULE: ICD-10-CM

## 2025-08-21 DIAGNOSIS — Z80.42 FAMILY HISTORY OF MALIGNANT NEOPLASM OF PROSTATE: ICD-10-CM

## 2025-08-21 DIAGNOSIS — Z78.9 OTHER SPECIFIED HEALTH STATUS: ICD-10-CM

## 2025-08-21 DIAGNOSIS — F17.290 NICOTINE DEPENDENCE, OTHER TOBACCO PRODUCT, UNCOMPLICATED: ICD-10-CM

## 2025-08-21 PROCEDURE — 99204 OFFICE O/P NEW MOD 45 MIN: CPT | Mod: 25

## 2025-08-21 PROCEDURE — 36415 COLL VENOUS BLD VENIPUNCTURE: CPT

## 2025-08-21 RX ORDER — ATORVASTATIN CALCIUM 80 MG/1
80 TABLET, FILM COATED ORAL
Refills: 0 | Status: ACTIVE | COMMUNITY

## 2025-08-21 RX ORDER — DAPAGLIFLOZIN 10 MG/1
10 TABLET, FILM COATED ORAL
Refills: 0 | Status: ACTIVE | COMMUNITY

## 2025-08-21 RX ORDER — CLOPIDOGREL 75 MG/1
75 TABLET, FILM COATED ORAL
Refills: 0 | Status: ACTIVE | COMMUNITY

## 2025-08-21 RX ORDER — AZILSARTAN KAMEDOXOMIL 40 MG/1
40 TABLET ORAL
Refills: 0 | Status: ACTIVE | COMMUNITY

## 2025-08-21 RX ORDER — ASPIRIN 81 MG/1
81 TABLET, DELAYED RELEASE ORAL
Refills: 0 | Status: ACTIVE | COMMUNITY

## 2025-08-21 RX ORDER — SULFAMETHOXAZOLE AND TRIMETHOPRIM 800; 160 MG/1; MG/1
800-160 TABLET ORAL
Qty: 78 | Refills: 1 | Status: COMPLETED | COMMUNITY
Start: 2025-08-12 | End: 2025-08-21

## 2025-08-21 RX ORDER — METOPROLOL TARTRATE 50 MG/1
50 TABLET ORAL
Refills: 0 | Status: ACTIVE | COMMUNITY

## 2025-08-21 RX ORDER — METFORMIN HYDROCHLORIDE 500 MG/1
500 TABLET, COATED ORAL
Refills: 0 | Status: ACTIVE | COMMUNITY

## 2025-08-22 LAB
T3 SERPL-MCNC: 100 NG/DL
T4 FREE SERPL-MCNC: 1.1 NG/DL
THYROGLOB AB SERPL-ACNC: <15 IU/ML
THYROPEROXIDASE AB SERPL IA-ACNC: <9 IU/ML
TSH SERPL-ACNC: 2.59 UIU/ML

## 2025-08-26 ENCOUNTER — OUTPATIENT (OUTPATIENT)
Dept: OUTPATIENT SERVICES | Facility: HOSPITAL | Age: 80
LOS: 1 days | End: 2025-08-26
Payer: MEDICARE

## 2025-08-26 ENCOUNTER — APPOINTMENT (OUTPATIENT)
Dept: ULTRASOUND IMAGING | Facility: CLINIC | Age: 80
End: 2025-08-26
Payer: MEDICARE

## 2025-08-26 DIAGNOSIS — E04.1 NONTOXIC SINGLE THYROID NODULE: ICD-10-CM

## 2025-08-26 DIAGNOSIS — Z98.890 OTHER SPECIFIED POSTPROCEDURAL STATES: Chronic | ICD-10-CM

## 2025-08-26 PROCEDURE — 76536 US EXAM OF HEAD AND NECK: CPT | Mod: 26

## 2025-08-26 PROCEDURE — 76536 US EXAM OF HEAD AND NECK: CPT

## 2025-09-03 DIAGNOSIS — E04.1 NONTOXIC SINGLE THYROID NODULE: ICD-10-CM
